# Patient Record
Sex: FEMALE | Race: WHITE
[De-identification: names, ages, dates, MRNs, and addresses within clinical notes are randomized per-mention and may not be internally consistent; named-entity substitution may affect disease eponyms.]

---

## 2017-07-25 ENCOUNTER — HOSPITAL ENCOUNTER (EMERGENCY)
Dept: HOSPITAL 1 - ED | Age: 27
Discharge: HOME | End: 2017-07-25
Payer: COMMERCIAL

## 2017-07-25 VITALS — WEIGHT: 156 LBS | HEIGHT: 63 IN | BODY MASS INDEX: 27.64 KG/M2

## 2017-07-25 VITALS — SYSTOLIC BLOOD PRESSURE: 116 MMHG | DIASTOLIC BLOOD PRESSURE: 70 MMHG

## 2017-07-25 VITALS — DIASTOLIC BLOOD PRESSURE: 85 MMHG | SYSTOLIC BLOOD PRESSURE: 121 MMHG

## 2017-07-25 DIAGNOSIS — Z88.6: ICD-10-CM

## 2017-07-25 DIAGNOSIS — R10.31: Primary | ICD-10-CM

## 2017-07-25 DIAGNOSIS — Z88.1: ICD-10-CM

## 2017-07-25 LAB
ALBUMIN SERPL-MCNC: 3.4 G/DL (ref 3.4–5)
ALP SERPL-CCNC: 84 U/L (ref 46–116)
ALT SERPL-CCNC: 26 U/L (ref 14–59)
AST SERPL-CCNC: 14 U/L (ref 15–37)
BASOPHILS NFR BLD: 1.6 % (ref 0–2)
BILIRUB SERPL-MCNC: 1.32 MG/DL (ref 0.2–1)
BUN SERPL-MCNC: 6 MG/DL (ref 7–18)
CALCIUM SERPL-MCNC: 8.3 MG/DL (ref 8.5–10.1)
CHLORIDE SERPL-SCNC: 103 MMOL/L (ref 98–107)
CO2 SERPL-SCNC: 23.8 MMOL/L (ref 21–32)
CREAT SERPL-MCNC: 0.7 MG/DL (ref 0.6–1)
ERYTHROCYTE [DISTWIDTH] IN BLOOD BY AUTOMATED COUNT: 12.1 % (ref 11.5–14.5)
GFR SERPLBLD BASED ON 1.73 SQ M-ARVRAT: > 60 ML/MIN
GLUCOSE SERPL-MCNC: 99 MG/DL (ref 74–106)
PLATELET # BLD: 333 X10^3MCL (ref 130–400)
POTASSIUM SERPL-SCNC: 3.8 MMOL/L (ref 3.5–5.1)
PROT SERPL-MCNC: 7.1 G/DL (ref 6.4–8.2)
SODIUM SERPL-SCNC: 137 MMOL/L (ref 136–145)

## 2019-01-13 ENCOUNTER — HOSPITAL ENCOUNTER (EMERGENCY)
Dept: HOSPITAL 1 - ED | Age: 29
Discharge: LEFT BEFORE DISCHARGE | End: 2019-01-13
Payer: COMMERCIAL

## 2019-01-13 VITALS — SYSTOLIC BLOOD PRESSURE: 172 MMHG | DIASTOLIC BLOOD PRESSURE: 105 MMHG

## 2019-01-13 VITALS
HEIGHT: 65 IN | BODY MASS INDEX: 24.99 KG/M2 | BODY MASS INDEX: 24.99 KG/M2 | HEIGHT: 65 IN | WEIGHT: 150 LBS | WEIGHT: 150 LBS

## 2019-01-13 DIAGNOSIS — Z98.890: ICD-10-CM

## 2019-01-13 DIAGNOSIS — Z88.6: ICD-10-CM

## 2019-01-13 DIAGNOSIS — S01.01XA: Primary | ICD-10-CM

## 2019-01-13 DIAGNOSIS — F32.9: ICD-10-CM
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
## 2019-01-13 DIAGNOSIS — F41.9: ICD-10-CM

## 2019-01-13 DIAGNOSIS — S09.8XXA: ICD-10-CM
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
## 2019-03-31 ENCOUNTER — HOSPITAL ENCOUNTER (EMERGENCY)
Dept: HOSPITAL 26 - MED | Age: 29
Discharge: HOME | End: 2019-03-31
Payer: SELF-PAY

## 2019-03-31 VITALS — SYSTOLIC BLOOD PRESSURE: 127 MMHG | DIASTOLIC BLOOD PRESSURE: 70 MMHG

## 2019-03-31 VITALS — WEIGHT: 140 LBS | HEIGHT: 63 IN | BODY MASS INDEX: 24.8 KG/M2

## 2019-03-31 VITALS — DIASTOLIC BLOOD PRESSURE: 70 MMHG | SYSTOLIC BLOOD PRESSURE: 127 MMHG

## 2019-03-31 DIAGNOSIS — J30.9: Primary | ICD-10-CM

## 2019-03-31 DIAGNOSIS — Z88.6: ICD-10-CM

## 2019-03-31 DIAGNOSIS — Z98.890: ICD-10-CM

## 2019-03-31 NOTE — NUR
Patient discharged with v/s stable. Written and verbal after care instructions 
given and explained. 

Patient alert, oriented and verbalized understanding of instructions. 
Ambulatory with steady gait. All questions addressed prior to discharge. ID 
band removed. Patient advised to follow up with PMD. Rx of prednisone and 
diphenhydramine given. Patient educated on indication of medication including 
possible reaction and side effects. Opportunity to ask questions provided and 
answered.

## 2019-03-31 NOTE — NUR
PT STATED HAVING ALLERGIC REACTION X 4 DAYS, ITCHING, RUNNING NOSE, FEELING 
LIKE THROAT SWOLLEN, BEEN TAKING ALLERGY MED OTC. HX. OF HAY FEVER, STATED 
SYMPTOMS HAPPENED AFTER DRANK BEER. BL LUNG CLEAR. AIRWAY INTACT. VSS; PATIENT 
POSITIONED FOR COMFORT; HOB ELEVATED; BEDRAILS UP X1; BED DOWN. ER MD MADE 
AWARE OF PT STATUS.

## 2019-10-11 ENCOUNTER — HOSPITAL ENCOUNTER (EMERGENCY)
Dept: HOSPITAL 1 - ED | Age: 29
Discharge: HOME | End: 2019-10-11
Payer: COMMERCIAL

## 2019-10-11 VITALS — SYSTOLIC BLOOD PRESSURE: 109 MMHG | DIASTOLIC BLOOD PRESSURE: 70 MMHG

## 2019-10-11 VITALS — WEIGHT: 147 LBS | HEIGHT: 63 IN | BODY MASS INDEX: 26.05 KG/M2

## 2019-10-11 DIAGNOSIS — Z88.6: ICD-10-CM

## 2019-10-11 DIAGNOSIS — F41.9: ICD-10-CM

## 2019-10-11 DIAGNOSIS — B34.9: Primary | ICD-10-CM

## 2019-10-11 DIAGNOSIS — F32.9: ICD-10-CM

## 2019-12-22 ENCOUNTER — HOSPITAL ENCOUNTER (EMERGENCY)
Dept: HOSPITAL 1 - ED | Age: 29
Discharge: LEFT BEFORE DISCHARGE | End: 2019-12-22
Payer: COMMERCIAL

## 2019-12-22 VITALS
WEIGHT: 159 LBS | WEIGHT: 159 LBS | HEIGHT: 63 IN | HEIGHT: 63 IN | BODY MASS INDEX: 28.17 KG/M2 | BODY MASS INDEX: 28.17 KG/M2

## 2019-12-22 VITALS — DIASTOLIC BLOOD PRESSURE: 73 MMHG | SYSTOLIC BLOOD PRESSURE: 105 MMHG

## 2019-12-22 DIAGNOSIS — R05: ICD-10-CM

## 2019-12-22 DIAGNOSIS — Z98.890: ICD-10-CM

## 2019-12-22 DIAGNOSIS — M54.9: ICD-10-CM

## 2019-12-22 DIAGNOSIS — R53.1: Primary | ICD-10-CM

## 2019-12-22 DIAGNOSIS — Z88.6: ICD-10-CM

## 2021-07-31 ENCOUNTER — HOSPITAL ENCOUNTER (EMERGENCY)
Dept: HOSPITAL 26 - MED | Age: 31
Discharge: HOME | End: 2021-07-31
Payer: SELF-PAY

## 2021-07-31 VITALS — DIASTOLIC BLOOD PRESSURE: 77 MMHG | SYSTOLIC BLOOD PRESSURE: 113 MMHG

## 2021-07-31 VITALS — WEIGHT: 163 LBS | HEIGHT: 63 IN | BODY MASS INDEX: 28.88 KG/M2

## 2021-07-31 VITALS — SYSTOLIC BLOOD PRESSURE: 113 MMHG | DIASTOLIC BLOOD PRESSURE: 77 MMHG

## 2021-07-31 DIAGNOSIS — R19.7: ICD-10-CM

## 2021-07-31 DIAGNOSIS — Z88.6: ICD-10-CM

## 2021-07-31 DIAGNOSIS — R11.2: Primary | ICD-10-CM

## 2021-07-31 DIAGNOSIS — Z88.1: ICD-10-CM

## 2021-07-31 DIAGNOSIS — R10.32: ICD-10-CM

## 2021-07-31 PROCEDURE — 81025 URINE PREGNANCY TEST: CPT

## 2021-07-31 PROCEDURE — 99284 EMERGENCY DEPT VISIT MOD MDM: CPT

## 2021-07-31 PROCEDURE — 81002 URINALYSIS NONAUTO W/O SCOPE: CPT

## 2021-07-31 NOTE — NUR
Patient discharged with v/s stable. Written and verbal after care instructions 
given and explained. 

Patient alert, oriented and verbalized understanding of instructions. 
Ambulatory with to car. All questions addressed prior to discharge. ID band 
removed. Patient advised to follow up with PMD. Rx of ZOFRAN, IBUPROFEN given. 
Patient educated on indication of medication including possible reaction and 
side effects. Opportunity to ask questions provided and answered.

## 2021-07-31 NOTE — NUR
31 Y/O F BIB SPOUSE FROM HOME, PATIENT PRESENTS TO ED WITH C/O N/V/D WITH 10X 
EPISODES OF EMESIS AND DIARRHEA. PT STATES ABD PAIN IS SUPRAPUBIC 8/10. UPON 
ASSESSMENT, NORMOACTIVE BOWEL SOUNDS X4, LUQ TENDER; SKIN IS PINK/WARM/DRY; 
AAOX4 WITH EVEN AND STEADY GAIT; LUNGS CLEAR BL; HR EVEN AND TACHY; PT DENIES 
ANY FEVER, CP, SOB, OR COUGH AT THIS TIME; PATIENT POSITIONED FOR COMFORT; HOB 
ELEVATED; BEDRAILS UP X2; BED DOWN. ER MD MADE AWARE OF PT STATUS.



PMH: DENIES

ALLERGY: AMOXICILLIN, IBUPROFEN, "SOMETHING WITH A K"

MED: THERAFLU, PEPCID